# Patient Record
Sex: FEMALE | Race: WHITE | Employment: OTHER | URBAN - METROPOLITAN AREA
[De-identification: names, ages, dates, MRNs, and addresses within clinical notes are randomized per-mention and may not be internally consistent; named-entity substitution may affect disease eponyms.]

---

## 2017-01-01 ENCOUNTER — TELEPHONE (OUTPATIENT)
Dept: INTERNAL MEDICINE CLINIC | Facility: CLINIC | Age: 82
End: 2017-01-01

## 2017-01-01 RX ORDER — CARVEDILOL 6.25 MG/1
6.25 TABLET ORAL 2 TIMES DAILY WITH MEALS
Qty: 180 TABLET | Refills: 0 | Status: SHIPPED | OUTPATIENT
Start: 2017-01-01

## 2017-01-09 NOTE — TELEPHONE ENCOUNTER
[facility] Ori from [facility]. called in to follow up. [facility] is hoping for a call back today, please.

## 2017-01-09 NOTE — TELEPHONE ENCOUNTER
Pneumonia shot given 10/27/2009. Left message with Viji Carias from [facility] [address] with this information.

## 2017-02-09 NOTE — TELEPHONE ENCOUNTER
Tuyet calling from [facility] to inform Kathy Mendez of patient admission into [facility] for an amputation of toe.

## 2017-02-09 NOTE — TELEPHONE ENCOUNTER
Karla Lemus from [facility] calling back to advise pt was there for procedure and now want to do toe amputation. Transferred to [facility].

## 2017-07-07 NOTE — TELEPHONE ENCOUNTER
Home Health Supplemental Orders dated 6/23/16 to 8-16-16 faxed, confirmed sent and placed for scanning to chart.